# Patient Record
Sex: FEMALE | Race: WHITE | Employment: UNEMPLOYED | URBAN - METROPOLITAN AREA
[De-identification: names, ages, dates, MRNs, and addresses within clinical notes are randomized per-mention and may not be internally consistent; named-entity substitution may affect disease eponyms.]

---

## 2022-07-29 PROBLEM — K21.9 GERD (GASTROESOPHAGEAL REFLUX DISEASE): Status: ACTIVE | Noted: 2022-07-29

## 2022-07-29 PROBLEM — Z3A.29 29 WEEKS GESTATION OF PREGNANCY: Status: ACTIVE | Noted: 2022-07-29

## 2022-07-29 PROBLEM — O21.9 VOMITING DURING PREGNANCY IN THIRD TRIMESTER: Status: ACTIVE | Noted: 2022-07-29

## 2022-09-18 PROBLEM — O34.219 UTERINE SCAR FROM PREVIOUS CESAREAN DELIVERY, ANTEPARTUM CONDITION OR COMPLICATION: Status: ACTIVE | Noted: 2022-09-18

## 2022-09-18 PROBLEM — O36.8330 CATEGORY II FETAL HEART RATE TRACING DURING MATERNAL CARE IN THIRD TRIMESTER: Status: ACTIVE | Noted: 2022-09-18

## 2024-01-26 PROBLEM — Z36.89 NST (NON-STRESS TEST) REACTIVE: Status: ACTIVE | Noted: 2024-01-26

## 2024-01-26 PROBLEM — R10.2 PELVIC PAIN AFFECTING PREGNANCY IN THIRD TRIMESTER, ANTEPARTUM: Status: ACTIVE | Noted: 2024-01-26

## 2024-01-26 PROBLEM — O26.893 PELVIC PAIN AFFECTING PREGNANCY IN THIRD TRIMESTER, ANTEPARTUM: Status: ACTIVE | Noted: 2024-01-26

## 2024-03-05 PROBLEM — O36.8330 CATEGORY II FETAL HEART RATE TRACING DURING MATERNAL CARE IN THIRD TRIMESTER: Status: RESOLVED | Noted: 2022-09-18 | Resolved: 2024-03-05

## 2024-03-05 PROBLEM — Z36.89 NST (NON-STRESS TEST) REACTIVE: Status: RESOLVED | Noted: 2024-01-26 | Resolved: 2024-03-05

## 2024-03-05 PROBLEM — Z3A.29 29 WEEKS GESTATION OF PREGNANCY: Status: RESOLVED | Noted: 2022-07-29 | Resolved: 2024-03-05

## 2024-03-05 PROBLEM — O26.893 HEADACHE IN PREGNANCY, ANTEPARTUM, THIRD TRIMESTER: Status: ACTIVE | Noted: 2024-03-05

## 2024-03-05 PROBLEM — R51.9 HEADACHE IN PREGNANCY, ANTEPARTUM, THIRD TRIMESTER: Status: ACTIVE | Noted: 2024-03-05

## 2024-03-05 PROBLEM — O26.893 PELVIC PAIN AFFECTING PREGNANCY IN THIRD TRIMESTER, ANTEPARTUM: Status: RESOLVED | Noted: 2024-01-26 | Resolved: 2024-03-05

## 2024-03-05 PROBLEM — K21.9 GERD (GASTROESOPHAGEAL REFLUX DISEASE): Status: RESOLVED | Noted: 2022-07-29 | Resolved: 2024-03-05

## 2024-03-05 PROBLEM — R10.2 PELVIC PAIN AFFECTING PREGNANCY IN THIRD TRIMESTER, ANTEPARTUM: Status: RESOLVED | Noted: 2024-01-26 | Resolved: 2024-03-05

## 2024-03-05 PROBLEM — O41.00X0: Status: ACTIVE | Noted: 2024-03-05

## 2024-03-05 PROBLEM — O21.9 VOMITING DURING PREGNANCY IN THIRD TRIMESTER: Status: RESOLVED | Noted: 2022-07-29 | Resolved: 2024-03-05

## 2024-03-05 PROBLEM — O99.013 ANEMIA AFFECTING PREGNANCY IN THIRD TRIMESTER: Status: ACTIVE | Noted: 2024-03-05

## 2024-03-05 PROBLEM — R50.9 FEVER: Status: ACTIVE | Noted: 2024-03-05

## 2024-03-13 PROBLEM — R51.9 HEADACHE IN PREGNANCY, ANTEPARTUM, THIRD TRIMESTER: Status: RESOLVED | Noted: 2024-03-05 | Resolved: 2024-03-13

## 2024-03-13 PROBLEM — O26.893 HEADACHE IN PREGNANCY, ANTEPARTUM, THIRD TRIMESTER: Status: RESOLVED | Noted: 2024-03-05 | Resolved: 2024-03-13

## 2024-03-13 PROBLEM — O47.9 UTERINE CONTRACTIONS: Status: ACTIVE | Noted: 2024-03-13

## 2024-03-13 PROBLEM — R50.9 FEVER: Status: RESOLVED | Noted: 2024-03-05 | Resolved: 2024-03-13

## 2024-03-13 PROBLEM — O36.8130 DECREASED FETAL MOVEMENTS IN THIRD TRIMESTER: Status: ACTIVE | Noted: 2024-03-13

## 2024-03-18 PROBLEM — F41.9 ANXIETY AND DEPRESSION: Status: ACTIVE | Noted: 2024-03-18

## 2024-03-18 PROBLEM — O34.219 PREVIOUS CESAREAN SECTION COMPLICATING PREGNANCY: Status: ACTIVE | Noted: 2022-09-18

## 2024-03-18 PROBLEM — J45.909 MILD ASTHMA WITHOUT COMPLICATION: Status: ACTIVE | Noted: 2024-03-18

## 2024-03-18 PROBLEM — R10.9 ABDOMINAL PAIN IN PREGNANCY, THIRD TRIMESTER: Status: ACTIVE | Noted: 2024-03-05

## 2024-03-18 PROBLEM — Z3A.38 38 WEEKS GESTATION OF PREGNANCY: Status: ACTIVE | Noted: 2024-03-18

## 2024-03-18 PROBLEM — F32.A ANXIETY AND DEPRESSION: Status: ACTIVE | Noted: 2024-03-18

## 2024-03-18 PROBLEM — Z30.2 ENCOUNTER FOR STERILIZATION: Status: ACTIVE | Noted: 2024-03-18

## 2024-03-18 PROBLEM — O36.60X0 LGA (LARGE FOR GESTATIONAL AGE) FETUS AFFECTING MANAGEMENT OF MOTHER: Status: ACTIVE | Noted: 2024-03-18

## 2024-09-24 ENCOUNTER — OFFICE VISIT (OUTPATIENT)
Facility: CLINIC | Age: 29
End: 2024-09-24

## 2024-09-24 VITALS
TEMPERATURE: 98 F | OXYGEN SATURATION: 97 % | HEIGHT: 67 IN | HEART RATE: 75 BPM | RESPIRATION RATE: 16 BRPM | DIASTOLIC BLOOD PRESSURE: 67 MMHG | BODY MASS INDEX: 28.98 KG/M2 | SYSTOLIC BLOOD PRESSURE: 104 MMHG

## 2024-09-24 DIAGNOSIS — Z76.89 ENCOUNTER TO ESTABLISH CARE: Primary | ICD-10-CM

## 2024-09-24 DIAGNOSIS — Z86.39 HISTORY OF IRON DEFICIENCY: ICD-10-CM

## 2024-09-24 DIAGNOSIS — Z13.89 SCREENING FOR HEMATURIA OR PROTEINURIA: ICD-10-CM

## 2024-09-24 DIAGNOSIS — F41.9 ANXIETY AND DEPRESSION: ICD-10-CM

## 2024-09-24 DIAGNOSIS — E55.9 VITAMIN D DEFICIENCY: ICD-10-CM

## 2024-09-24 DIAGNOSIS — J45.20 MILD INTERMITTENT ASTHMA WITHOUT COMPLICATION: ICD-10-CM

## 2024-09-24 DIAGNOSIS — R55 SYNCOPE, UNSPECIFIED SYNCOPE TYPE: ICD-10-CM

## 2024-09-24 DIAGNOSIS — Z13.220 SCREENING CHOLESTEROL LEVEL: ICD-10-CM

## 2024-09-24 DIAGNOSIS — F32.A ANXIETY AND DEPRESSION: ICD-10-CM

## 2024-09-24 DIAGNOSIS — S99.922A INJURY OF LEFT FOOT, INITIAL ENCOUNTER: ICD-10-CM

## 2024-09-24 DIAGNOSIS — F31.9 BIPOLAR DEPRESSION (HCC): ICD-10-CM

## 2024-09-24 DIAGNOSIS — Z13.1 SCREENING FOR DIABETES MELLITUS: ICD-10-CM

## 2024-09-24 PROBLEM — O41.00X0: Status: RESOLVED | Noted: 2024-03-05 | Resolved: 2024-09-24

## 2024-09-24 PROBLEM — O99.013 ANEMIA AFFECTING PREGNANCY IN THIRD TRIMESTER: Status: RESOLVED | Noted: 2024-03-05 | Resolved: 2024-09-24

## 2024-09-24 PROBLEM — Z30.2 ENCOUNTER FOR STERILIZATION: Status: RESOLVED | Noted: 2024-03-18 | Resolved: 2024-09-24

## 2024-09-24 PROBLEM — R10.9 ABDOMINAL PAIN IN PREGNANCY, THIRD TRIMESTER: Status: RESOLVED | Noted: 2024-03-05 | Resolved: 2024-09-24

## 2024-09-24 PROBLEM — O26.893 ABDOMINAL PAIN IN PREGNANCY, THIRD TRIMESTER: Status: RESOLVED | Noted: 2024-03-05 | Resolved: 2024-09-24

## 2024-09-24 PROBLEM — O36.60X0 LGA (LARGE FOR GESTATIONAL AGE) FETUS AFFECTING MANAGEMENT OF MOTHER: Status: RESOLVED | Noted: 2024-03-18 | Resolved: 2024-09-24

## 2024-09-24 RX ORDER — ALBUTEROL SULFATE 90 UG/1
2 INHALANT RESPIRATORY (INHALATION) 4 TIMES DAILY PRN
Qty: 36 G | Refills: 3 | Status: SHIPPED | OUTPATIENT
Start: 2024-09-24

## 2024-09-24 SDOH — HEALTH STABILITY: PHYSICAL HEALTH: ON AVERAGE, HOW MANY DAYS PER WEEK DO YOU ENGAGE IN MODERATE TO STRENUOUS EXERCISE (LIKE A BRISK WALK)?: 7 DAYS

## 2024-09-24 SDOH — ECONOMIC STABILITY: FOOD INSECURITY: WITHIN THE PAST 12 MONTHS, YOU WORRIED THAT YOUR FOOD WOULD RUN OUT BEFORE YOU GOT MONEY TO BUY MORE.: NEVER TRUE

## 2024-09-24 SDOH — ECONOMIC STABILITY: FOOD INSECURITY: WITHIN THE PAST 12 MONTHS, THE FOOD YOU BOUGHT JUST DIDN'T LAST AND YOU DIDN'T HAVE MONEY TO GET MORE.: NEVER TRUE

## 2024-09-24 SDOH — ECONOMIC STABILITY: INCOME INSECURITY: HOW HARD IS IT FOR YOU TO PAY FOR THE VERY BASICS LIKE FOOD, HOUSING, MEDICAL CARE, AND HEATING?: VERY HARD

## 2024-09-24 SDOH — HEALTH STABILITY: PHYSICAL HEALTH: ON AVERAGE, HOW MANY MINUTES DO YOU ENGAGE IN EXERCISE AT THIS LEVEL?: 150+ MIN

## 2024-09-24 ASSESSMENT — PATIENT HEALTH QUESTIONNAIRE - PHQ9
7. TROUBLE CONCENTRATING ON THINGS, SUCH AS READING THE NEWSPAPER OR WATCHING TELEVISION: NOT AT ALL
SUM OF ALL RESPONSES TO PHQ QUESTIONS 1-9: 11
4. FEELING TIRED OR HAVING LITTLE ENERGY: NEARLY EVERY DAY
10. IF YOU CHECKED OFF ANY PROBLEMS, HOW DIFFICULT HAVE THESE PROBLEMS MADE IT FOR YOU TO DO YOUR WORK, TAKE CARE OF THINGS AT HOME, OR GET ALONG WITH OTHER PEOPLE: SOMEWHAT DIFFICULT
SUM OF ALL RESPONSES TO PHQ QUESTIONS 1-9: 11
1. LITTLE INTEREST OR PLEASURE IN DOING THINGS: NOT AT ALL
3. TROUBLE FALLING OR STAYING ASLEEP: MORE THAN HALF THE DAYS
SUM OF ALL RESPONSES TO PHQ QUESTIONS 1-9: 11
6. FEELING BAD ABOUT YOURSELF - OR THAT YOU ARE A FAILURE OR HAVE LET YOURSELF OR YOUR FAMILY DOWN: SEVERAL DAYS
SUM OF ALL RESPONSES TO PHQ9 QUESTIONS 1 & 2: 2
2. FEELING DOWN, DEPRESSED OR HOPELESS: MORE THAN HALF THE DAYS
8. MOVING OR SPEAKING SO SLOWLY THAT OTHER PEOPLE COULD HAVE NOTICED. OR THE OPPOSITE, BEING SO FIGETY OR RESTLESS THAT YOU HAVE BEEN MOVING AROUND A LOT MORE THAN USUAL: NOT AT ALL
9. THOUGHTS THAT YOU WOULD BE BETTER OFF DEAD, OR OF HURTING YOURSELF: NOT AT ALL
SUM OF ALL RESPONSES TO PHQ QUESTIONS 1-9: 11
5. POOR APPETITE OR OVEREATING: NEARLY EVERY DAY

## 2024-09-24 ASSESSMENT — ENCOUNTER SYMPTOMS
NAUSEA: 0
BLOOD IN STOOL: 0
CONSTIPATION: 0
COUGH: 0
SHORTNESS OF BREATH: 0
VOMITING: 0
RHINORRHEA: 0
DIARRHEA: 0

## 2024-09-26 LAB
BACTERIA: NEGATIVE
BASOPHILS ABSOLUTE: 0.1 K/UL (ref 0–0.2)
BASOPHILS RELATIVE PERCENT: 1 % (ref 0–2)
BILIRUB SERPL-MCNC: NEGATIVE MG/DL
BLOOD: NEGATIVE
CHOLESTEROL, TOTAL: 165 MG/DL (ref 110–200)
CHOLESTEROL/HDL RATIO: 3.6 (ref 0–5)
CLARITY, UA: CLEAR
COLOR, UA: YELLOW
EOSINOPHIL # BLD: 4 % (ref 0–6)
EOSINOPHILS ABSOLUTE: 0.4 K/UL (ref 0–0.5)
EPITHELIAL CELLS: NORMAL /HPF
ESTIMATED AVERAGE GLUCOSE: 102 MG/DL (ref 91–123)
FERRITIN: 14 NG/ML (ref 10–291)
GLUCOSE: NEGATIVE MG/DL
HBA1C MFR BLD: 5.2 % (ref 4.8–5.6)
HCT VFR BLD CALC: 44.7 % (ref 35.1–46.5)
HDLC SERPL-MCNC: 46 MG/DL
HEMOGLOBIN: 13.8 G/DL (ref 11.7–15.5)
HYALINE CASTS: NORMAL /LPF (ref 0–2)
IRON % SATURATION: 10 % (ref 20–50)
IRON: 43 MCG/DL (ref 30–160)
KETONES, URINE: NEGATIVE MG/DL
LDL CHOLESTEROL: 86 MG/DL (ref 50–99)
LDL/HDL RATIO: 1.9
LEUKOCYTE ESTERASE, URINE: NEGATIVE
LYMPHOCYTES # BLD: 21 % (ref 20–45)
LYMPHOCYTES ABSOLUTE: 2.3 K/UL (ref 1–4.8)
MCH RBC QN AUTO: 27 PG (ref 26–34)
MCHC RBC AUTO-ENTMCNC: 31 G/DL (ref 31–36)
MCV RBC AUTO: 86 FL (ref 80–99)
MONOCYTES ABSOLUTE: 0.9 K/UL (ref 0.1–1)
MONOCYTES: 9 % (ref 3–12)
NEUTROPHILS ABSOLUTE: 7 K/UL (ref 1.8–7.7)
NEUTROPHILS: 65 % (ref 40–75)
NITRITE, URINE: NEGATIVE
NON-HDL CHOLESTEROL: 119 MG/DL
PDW BLD-RTO: 16.4 % (ref 10–15.5)
PH, URINE: 6 PH (ref 5–8)
PLATELET # BLD: 368 K/UL (ref 140–440)
PMV BLD AUTO: 10.5 FL (ref 9–13)
PROTEIN UA: NEGATIVE MG/DL
RBC # BLD: 5.19 M/UL (ref 3.8–5.2)
RBC URINE: NORMAL /HPF
SPECIFIC GRAVITY UA: 1.01 (ref 1–1.03)
TOTAL IRON BINDING CAPACITY: 439 MCG/DL (ref 228–428)
TRIGL SERPL-MCNC: 164 MG/DL (ref 40–149)
TSH SERPL DL<=0.05 MIU/L-ACNC: 4.4 MCU/ML (ref 0.27–4.2)
UIBC: 396 MCG/DL (ref 110–370)
UROBILINOGEN, URINE: 0.2 MG/DL
VITAMIN D 25-HYDROXY: 32.9 NG/ML (ref 32–100)
VLDLC SERPL CALC-MCNC: 33 MG/DL (ref 8–30)
WBC # BLD: 10.8 K/UL (ref 4–11)
WBC UA: NORMAL /HPF (ref 0–5)

## 2024-10-21 ENCOUNTER — TELEPHONE (OUTPATIENT)
Facility: CLINIC | Age: 29
End: 2024-10-21

## 2024-10-21 DIAGNOSIS — R55 SYNCOPE, UNSPECIFIED SYNCOPE TYPE: Primary | ICD-10-CM

## 2024-10-21 NOTE — TELEPHONE ENCOUNTER
----- Message from Clara POMPA sent at 10/21/2024 10:13 AM EDT -----  Regarding: referral to Dr Nixon  Hi Dr Do ,    Could you place an internal referral to Dr Nixon for this patient? Her insurance is not accepted anywhere and [facility] said they are still not taking new referrals .     Thank you, Clara

## 2024-10-28 ENCOUNTER — HOSPITAL ENCOUNTER (OUTPATIENT)
Facility: HOSPITAL | Age: 29
Setting detail: SPECIMEN
Discharge: HOME | End: 2024-10-31
Payer: COMMERCIAL

## 2024-10-28 ENCOUNTER — OFFICE VISIT (OUTPATIENT)
Facility: CLINIC | Age: 29
End: 2024-10-28
Payer: COMMERCIAL

## 2024-10-28 VITALS
OXYGEN SATURATION: 98 % | SYSTOLIC BLOOD PRESSURE: 107 MMHG | TEMPERATURE: 98 F | DIASTOLIC BLOOD PRESSURE: 83 MMHG | HEART RATE: 68 BPM | BODY MASS INDEX: 25.18 KG/M2 | HEIGHT: 67 IN | WEIGHT: 160.4 LBS | RESPIRATION RATE: 16 BRPM

## 2024-10-28 DIAGNOSIS — E61.1 IRON DEFICIENCY: ICD-10-CM

## 2024-10-28 DIAGNOSIS — Z00.00 ANNUAL PHYSICAL EXAM: Primary | ICD-10-CM

## 2024-10-28 DIAGNOSIS — R79.89 ELEVATED TSH: ICD-10-CM

## 2024-10-28 DIAGNOSIS — Z12.83 SKIN CANCER SCREENING: ICD-10-CM

## 2024-10-28 DIAGNOSIS — F32.A ANXIETY AND DEPRESSION: ICD-10-CM

## 2024-10-28 DIAGNOSIS — F41.9 ANXIETY AND DEPRESSION: ICD-10-CM

## 2024-10-28 DIAGNOSIS — M85.88 OSTEOPENIA OF OTHER SITE: ICD-10-CM

## 2024-10-28 DIAGNOSIS — R74.8 LOW SERUM HDL: ICD-10-CM

## 2024-10-28 DIAGNOSIS — F31.9 BIPOLAR DEPRESSION (HCC): ICD-10-CM

## 2024-10-28 LAB
T3FREE SERPL-MCNC: 2.8 PG/ML (ref 2.18–3.98)
T4 FREE SERPL-MCNC: 0.9 NG/DL (ref 0.7–1.5)
TSH SERPL DL<=0.05 MIU/L-ACNC: 2.35 UIU/ML (ref 0.36–3.74)

## 2024-10-28 PROCEDURE — 99214 OFFICE O/P EST MOD 30 MIN: CPT | Performed by: FAMILY MEDICINE

## 2024-10-28 PROCEDURE — 84439 ASSAY OF FREE THYROXINE: CPT

## 2024-10-28 PROCEDURE — 36415 COLL VENOUS BLD VENIPUNCTURE: CPT

## 2024-10-28 PROCEDURE — 84443 ASSAY THYROID STIM HORMONE: CPT

## 2024-10-28 PROCEDURE — 84481 FREE ASSAY (FT-3): CPT

## 2024-10-28 PROCEDURE — 99395 PREV VISIT EST AGE 18-39: CPT | Performed by: FAMILY MEDICINE

## 2024-10-28 RX ORDER — CALCIUM CARBONATE/VITAMIN D3 500MG-5MCG
TABLET ORAL
Qty: 180 TABLET | Refills: 2 | Status: SHIPPED | OUTPATIENT
Start: 2024-10-28

## 2024-10-28 ASSESSMENT — ENCOUNTER SYMPTOMS
VOMITING: 0
SHORTNESS OF BREATH: 0
DIARRHEA: 0
NAUSEA: 0
RHINORRHEA: 0
COUGH: 0

## 2024-10-28 ASSESSMENT — PATIENT HEALTH QUESTIONNAIRE - PHQ9
5. POOR APPETITE OR OVEREATING: MORE THAN HALF THE DAYS
3. TROUBLE FALLING OR STAYING ASLEEP: MORE THAN HALF THE DAYS
SUM OF ALL RESPONSES TO PHQ QUESTIONS 1-9: 10
4. FEELING TIRED OR HAVING LITTLE ENERGY: NEARLY EVERY DAY
SUM OF ALL RESPONSES TO PHQ QUESTIONS 1-9: 10
10. IF YOU CHECKED OFF ANY PROBLEMS, HOW DIFFICULT HAVE THESE PROBLEMS MADE IT FOR YOU TO DO YOUR WORK, TAKE CARE OF THINGS AT HOME, OR GET ALONG WITH OTHER PEOPLE: SOMEWHAT DIFFICULT
SUM OF ALL RESPONSES TO PHQ QUESTIONS 1-9: 10
SUM OF ALL RESPONSES TO PHQ9 QUESTIONS 1 & 2: 3
1. LITTLE INTEREST OR PLEASURE IN DOING THINGS: SEVERAL DAYS
6. FEELING BAD ABOUT YOURSELF - OR THAT YOU ARE A FAILURE OR HAVE LET YOURSELF OR YOUR FAMILY DOWN: NOT AT ALL
9. THOUGHTS THAT YOU WOULD BE BETTER OFF DEAD, OR OF HURTING YOURSELF: NOT AT ALL
8. MOVING OR SPEAKING SO SLOWLY THAT OTHER PEOPLE COULD HAVE NOTICED. OR THE OPPOSITE, BEING SO FIGETY OR RESTLESS THAT YOU HAVE BEEN MOVING AROUND A LOT MORE THAN USUAL: NOT AT ALL
7. TROUBLE CONCENTRATING ON THINGS, SUCH AS READING THE NEWSPAPER OR WATCHING TELEVISION: NOT AT ALL
SUM OF ALL RESPONSES TO PHQ QUESTIONS 1-9: 10
2. FEELING DOWN, DEPRESSED OR HOPELESS: MORE THAN HALF THE DAYS

## 2024-10-28 NOTE — PATIENT INSTRUCTIONS
Call [phone] for Central Scheduling    Call [phone] to schedule your imaging study with [facility]          [facility]  Main Phone:  [phone]    Scheduling Phone:  [phone]    Fax (Scheduling):  [phone]    ?  [facility]  [address] [place] [id number]    MRI, X-ray, Ultrasound    HOURS: Mon. - Sat. 6:00am to 10:30pm,  Sun. 8:00am to 4:30pm    ?  [facility]  [address], [address]  [place] [address]    MRI, CT, C-arm Fluoroscopy, Nuclear Medicine, X-ray, Ultrasound    HOURS: Mon. - Sat. 6:00am to 10:30pm,  Sun. 8:00am to 4:30pm    ?  [facility]  [address] [place] [address]    MRI, CT, X-ray, Ultrasound    HOURS: Mon. - Sat. 6:00am to 10:30pm,  Sun. 8:00am to 4:30pm          [facility]  Phone:  [phone]    Fax:  [phone]    ?  [address] [address]    Open MRI    HOURS: M-F 7:00 am - 10:00 pm    Weekends available    ?  [facility]  [address], [place] [address]    MRI, CT and XR    HOURS: M- F 7:00 am - 5:00 pm

## 2024-10-28 NOTE — PROGRESS NOTES
[address] [place] [address]               [phone]        Tanika Pina is a 29 y.o. female and presents with Annual Exam and Discuss Labs           Assessment/Plan:  Tanika was seen today for annual exam and discuss labs.    Diagnoses and all orders for this visit:    Annual physical exam    Low serum HDL    Iron deficiency    Elevated TSH  -     T3, Free; Future  -     TSH + Free T4 Panel; Future    Osteopenia of other site  -     Calcium Carb-Cholecalciferol (CALCIUM + VITAMIN D3) 500-5 MG-MCG TABS; Take one tablet po bid  -     DEXA BONE DENSITY AXIAL SKELETON; Future    Anxiety and depression  -     External Referral To Psychology    Bipolar depression (HCC)  -     External Referral To Psychology    Skin cancer screening  -     Amb External Referral To Dermatology      Advised to eat healthy diet and begin regular exercise with cardio 2.5 hours/week mixed with strength training;    Encouraged to keep eye/dental exam up to date    Iron deficiency: encouraged to begin otc iron with periods    Recheck thyroid labs today    Update dexa given fam hx     Refer to [facility]    Refer to Derm for screening     Given number for Neurology      Follow up and disposition:   Return in about 4 weeks (around 11/25/2024), or if symptoms worsen or fail to improve, for follow up -15 min.      Health Maintenance:   Health Maintenance   Topic Date Due    Varicella vaccine (1 of 2 - 13+ 2-dose series) Never done    Hepatitis C screen  Never done    Hepatitis B vaccine (1 of 3 - 19+ 3-dose series) Never done    DTaP/Tdap/Td vaccine (1 - Tdap) Never done    Pap smear  Never done    COVID-19 Vaccine (2 - 2023-24 season) 09/01/2024    Flu vaccine (1) 09/24/2025 (Originally 8/1/2024)    Pneumococcal 0-64 years Vaccine (1 of 2 - PCV) 09/24/2025 (Originally 6/25/2003)    Depression Monitoring  09/24/2025    HIV screen  Completed    Hepatitis A vaccine  Aged Out    Hib vaccine  Aged Out    HPV

## 2024-10-28 NOTE — PROGRESS NOTES
Tanika JACQUELINE Pina presents today for   Chief Complaint   Patient presents with    Annual Exam    Discuss Labs       Is someone accompanying this pt? no    Is the patient using any DME equipment during OV? no    Depression Screening:       No data to display                Learning Assessment:  Failed to redirect to the Timeline version of the REVFS SmartLink.    Health Maintenance reviewed and discussed and ordered per Provider.    Health Maintenance Due   Topic Date Due    Varicella vaccine (1 of 2 - 13+ 2-dose series) Never done    Hepatitis C screen  Never done    Hepatitis B vaccine (1 of 3 - 19+ 3-dose series) Never done    DTaP/Tdap/Td vaccine (1 - Tdap) Never done    Pap smear  Never done    COVID-19 Vaccine (2 - 2023-24 season) 09/01/2024   .    \"Have you been to the ER, urgent care clinic since your last visit?  Hospitalized since your last visit?\"    no    “Have you seen or consulted any other health care providers outside our system since your last visit?”    no     “Have you had a pap smear?”    Yes [facility]     No cervical cancer screening on file

## 2024-11-22 ENCOUNTER — OFFICE VISIT (OUTPATIENT)
Age: 29
End: 2024-11-22

## 2024-11-22 VITALS
HEIGHT: 67 IN | HEART RATE: 103 BPM | DIASTOLIC BLOOD PRESSURE: 76 MMHG | SYSTOLIC BLOOD PRESSURE: 117 MMHG | OXYGEN SATURATION: 94 % | RESPIRATION RATE: 16 BRPM | WEIGHT: 158.8 LBS | BODY MASS INDEX: 24.92 KG/M2 | TEMPERATURE: 99 F

## 2024-11-22 DIAGNOSIS — R55 VASOVAGAL SYNCOPE: Primary | ICD-10-CM

## 2024-11-22 DIAGNOSIS — G43.709 CHRONIC MIGRAINE W/O AURA W/O STATUS MIGRAINOSUS, NOT INTRACTABLE: ICD-10-CM

## 2024-11-22 DIAGNOSIS — M62.838 MUSCLE SPASMS OF NECK: ICD-10-CM

## 2024-11-22 DIAGNOSIS — G90.A POTS (POSTURAL ORTHOSTATIC TACHYCARDIA SYNDROME): ICD-10-CM

## 2024-11-22 DIAGNOSIS — M54.81 BILATERAL OCCIPITAL NEURALGIA: ICD-10-CM

## 2024-11-22 RX ORDER — AMITRIPTYLINE HYDROCHLORIDE 10 MG/1
10 TABLET ORAL NIGHTLY
Qty: 90 TABLET | Refills: 1 | Status: SHIPPED | OUTPATIENT
Start: 2024-11-22 | End: 2024-11-22 | Stop reason: ALTCHOICE

## 2024-11-22 RX ORDER — AMITRIPTYLINE HYDROCHLORIDE 10 MG/1
10 TABLET ORAL NIGHTLY
Qty: 90 TABLET | Refills: 1 | Status: SHIPPED | OUTPATIENT
Start: 2024-11-22

## 2024-11-22 RX ORDER — RIZATRIPTAN BENZOATE 10 MG/1
10 TABLET ORAL DAILY PRN
Qty: 12 TABLET | Refills: 5 | Status: SHIPPED | OUTPATIENT
Start: 2024-11-22

## 2024-11-22 NOTE — PROGRESS NOTES
SUBJECTIVE:   Tanika Pina is a 29 y.o. female seen regarding the following:     Chief Complaint   Patient presents with    Loss of Consciousness       HPI:  The patient is referred for evaluation of syncope.  The patient has episodes where she feels very hot and gets a metallic taste in the mouth and then pass out.  No shaking or convulsive activity.  She has seen a neurologist previously-eeg was done.  Tilt table test was ordered but she became pregnant. She was seen by cardiology-wore 14 dy heart monitor and everything was reassuring per patient.  When she pass out, it last for 1-2 minutes.  She knows what happened when she wakes up. She is aware when it is going to happen but does not have enough time to sit down. She is not confused when she wakes up. No tongue biting. No loss of bowel/bladder control.  Smoking marijuana can trigger the episodes so she does not smoke.  She gets a headache afterwards. Sx usually occur while standing. Never when lying down. She gets headaches outside of these events. She has \"regular\" headaches. Mor recently, she has beeg getting bilateral shooting pain from the occiput, she can't move her head when the sx occur then she gets pain in the entire head-described as throbbing pain.  No associated light/noise sensitivity usually. Occasionally high pitched noises can bother her with headaches. She gets blurred vision.  When she has the blurred vision, she has light sensitivity. That headache can last for hours even the entire day. She has difficulty sleeping due to having two toddlers and an infant. No snoring.  She had concussions in high school.      Past Medical History, Past Surgical History, Family history, Social History, and Medications were all reviewed with the patient today and updated as necessary.     Current Outpatient Medications   Medication Sig Dispense Refill    Calcium Carb-Cholecalciferol (CALCIUM + VITAMIN D3) 500-5 MG-MCG TABS Take one tablet po bid 180

## 2024-11-22 NOTE — PATIENT INSTRUCTIONS
Regular physical activity is important for migraine prevention. At least 30 minutes of physical activity such as yoga, walking four times per week.  2.    Guided relaxation and meditation techniques are important for migraine prevention and for overall wellbeing. Please visit www.[employer] or youtube for videos to learn how to do these techniques. Start out with at least five minutes of mindfulness activity daily.  3.  Maintain a regular sleep schedule.  4. Home stretching exercises are great if you have tightness or tension in your neck and shoulders.  5. Follow up as scheduled here in the neurology clinic.      Supportive care.  Behavioral measures such as staying hydrated high sodium diet changing positions slowly and compression stockings were discussed     MRI brain  EEG  Bloodwork  Amitriptyline 10 mg at bedtime for headache prevention  Rizatriptan-one tablet as soon as headache comes on  Call us if the occipital nerve acts up again so we can do an occipital nerve block

## 2024-12-01 PROBLEM — Z87.898 HISTORY OF SYNCOPE: Status: ACTIVE | Noted: 2024-12-01

## 2024-12-01 PROBLEM — G43.709 CHRONIC MIGRAINE WITHOUT AURA WITHOUT STATUS MIGRAINOSUS, NOT INTRACTABLE: Status: ACTIVE | Noted: 2024-12-01

## 2024-12-02 ENCOUNTER — TELEMEDICINE (OUTPATIENT)
Facility: CLINIC | Age: 29
End: 2024-12-02
Payer: COMMERCIAL

## 2024-12-02 ENCOUNTER — TELEPHONE (OUTPATIENT)
Facility: CLINIC | Age: 29
End: 2024-12-02

## 2024-12-02 DIAGNOSIS — F31.9 BIPOLAR DEPRESSION (HCC): Primary | ICD-10-CM

## 2024-12-02 DIAGNOSIS — R61 SWEATING INCREASE: ICD-10-CM

## 2024-12-02 DIAGNOSIS — G43.709 CHRONIC MIGRAINE WITHOUT AURA WITHOUT STATUS MIGRAINOSUS, NOT INTRACTABLE: ICD-10-CM

## 2024-12-02 DIAGNOSIS — Z87.898 HISTORY OF SYNCOPE: ICD-10-CM

## 2024-12-02 DIAGNOSIS — K59.03 DRUG-INDUCED CONSTIPATION: ICD-10-CM

## 2024-12-02 PROCEDURE — 99214 OFFICE O/P EST MOD 30 MIN: CPT | Performed by: FAMILY MEDICINE

## 2024-12-02 ASSESSMENT — PATIENT HEALTH QUESTIONNAIRE - PHQ9
4. FEELING TIRED OR HAVING LITTLE ENERGY: NOT AT ALL
SUM OF ALL RESPONSES TO PHQ QUESTIONS 1-9: 6
1. LITTLE INTEREST OR PLEASURE IN DOING THINGS: MORE THAN HALF THE DAYS
7. TROUBLE CONCENTRATING ON THINGS, SUCH AS READING THE NEWSPAPER OR WATCHING TELEVISION: NOT AT ALL
SUM OF ALL RESPONSES TO PHQ9 QUESTIONS 1 & 2: 3
9. THOUGHTS THAT YOU WOULD BE BETTER OFF DEAD, OR OF HURTING YOURSELF: NOT AT ALL
8. MOVING OR SPEAKING SO SLOWLY THAT OTHER PEOPLE COULD HAVE NOTICED. OR THE OPPOSITE, BEING SO FIGETY OR RESTLESS THAT YOU HAVE BEEN MOVING AROUND A LOT MORE THAN USUAL: NOT AT ALL
2. FEELING DOWN, DEPRESSED OR HOPELESS: SEVERAL DAYS
SUM OF ALL RESPONSES TO PHQ QUESTIONS 1-9: 6
SUM OF ALL RESPONSES TO PHQ QUESTIONS 1-9: 6
5. POOR APPETITE OR OVEREATING: NEARLY EVERY DAY
10. IF YOU CHECKED OFF ANY PROBLEMS, HOW DIFFICULT HAVE THESE PROBLEMS MADE IT FOR YOU TO DO YOUR WORK, TAKE CARE OF THINGS AT HOME, OR GET ALONG WITH OTHER PEOPLE: NOT DIFFICULT AT ALL
SUM OF ALL RESPONSES TO PHQ QUESTIONS 1-9: 6
6. FEELING BAD ABOUT YOURSELF - OR THAT YOU ARE A FAILURE OR HAVE LET YOURSELF OR YOUR FAMILY DOWN: NOT AT ALL
3. TROUBLE FALLING OR STAYING ASLEEP: NOT AT ALL

## 2024-12-02 ASSESSMENT — ENCOUNTER SYMPTOMS
SHORTNESS OF BREATH: 0
DIARRHEA: 0
NAUSEA: 0
CONSTIPATION: 1
VOMITING: 0
COUGH: 0
RHINORRHEA: 0

## 2024-12-02 NOTE — PROGRESS NOTES
Tanika Pina is a 29 y.o. female (: 1995) presenting to address:    Chief Complaint   Patient presents with    Follow-up       There were no vitals filed for this visit.    Coordination of Care Questionaire:     \"Have you been to the ER, urgent care clinic since your last visit?  Hospitalized since your last visit?\"    No     “Have you seen or consulted any other health care providers outside our system since your last visit?”    No      “Have you had a pap smear?”    No

## 2024-12-02 NOTE — PROGRESS NOTES
[address] [place] [address]               [phone]        Tanika Pina is a 29 y.o. female and presents with Follow-up           Assessment/Plan:  Tanika was seen today for follow-up.    Diagnoses and all orders for this visit:    Bipolar depression (HCC)    History of syncope    Chronic migraine without aura without status migrainosus, not intractable    Drug-induced constipation    Sweating increase    Depression: doing better; continue current meds through psych; sweating may be related to this/additional meds; normal thyroid; continue to monitor  No syncope per pt; continue migraine med treatment; advised to call to schedule MRI/EEG ordered through Neurology; advised to contact Neuro to adjust abortive migraine treatment if ineffective with 2nd dose; encouraged to keep meds on her person at all times for future use  Relates recent episode of constipation to iron supplement; advised to take docusate with each iron supplement          Follow up and disposition:   Return in about 3 months (around 3/4/2025), or if symptoms worsen or fail to improve, for follow up -15 min 3:30 virtual 15 min visit.      Health Maintenance:   Health Maintenance   Topic Date Due    Varicella vaccine (1 of 2 - 13+ 2-dose series) Never done    Hepatitis C screen  Never done    Hepatitis B vaccine (1 of 3 - 19+ 3-dose series) Never done    DTaP/Tdap/Td vaccine (1 - Tdap) Never done    Pap smear  Never done    COVID-19 Vaccine (2 - 2023-24 season) 09/01/2024    Flu vaccine (1) 09/24/2025 (Originally 8/1/2024)    Pneumococcal 0-64 years Vaccine (1 of 2 - PCV) 09/24/2025 (Originally 6/25/2003)    Depression Monitoring  10/28/2025    HIV screen  Completed    Hepatitis A vaccine  Aged Out    Hib vaccine  Aged Out    HPV vaccine  Aged Out    Polio vaccine  Aged Out    Meningococcal (ACWY) vaccine  Aged Out    Depression Screen  Discontinued        Subjective   30y/o female here for f/u on

## 2024-12-13 ENCOUNTER — HOSPITAL ENCOUNTER (OUTPATIENT)
Facility: HOSPITAL | Age: 29
Discharge: HOME | End: 2024-12-16
Payer: COMMERCIAL

## 2024-12-13 DIAGNOSIS — G43.709 CHRONIC MIGRAINE W/O AURA W/O STATUS MIGRAINOSUS, NOT INTRACTABLE: ICD-10-CM

## 2024-12-13 DIAGNOSIS — M62.838 MUSCLE SPASMS OF NECK: ICD-10-CM

## 2024-12-13 DIAGNOSIS — M54.81 BILATERAL OCCIPITAL NEURALGIA: ICD-10-CM

## 2024-12-13 DIAGNOSIS — R55 VASOVAGAL SYNCOPE: ICD-10-CM

## 2024-12-13 PROCEDURE — 95816 EEG AWAKE AND DROWSY: CPT

## 2024-12-13 PROCEDURE — 95816 EEG AWAKE AND DROWSY: CPT | Performed by: PSYCHIATRY & NEUROLOGY

## 2024-12-15 NOTE — PROCEDURES
Routine EEG Report    Ordering Physician: Panda Allison MD      Reason for EEG: syncope    Technical Summary: This EEG was performed with a 32-channel digital EEG machine with electrodes placed according to the international 10-20 system of placement.            Background:   In the maximally alert state, there is a normal posterior dominant rhythm approaching 9-10 hz bilaterally and symmetrically. Normal variability and reactivity noted.     Hyperventilation: Hyperventilation was performed and no abnormalaties noted.  Photic Stimulation: Photic stimulation was performed at various flash frequencies and no change in background noted    Sleep: Not captured    Impression: This EEG is normal. No focal epileptiform discharges, focal slowing noted. Clinical correlation advised.

## 2025-01-13 ENCOUNTER — HOSPITAL ENCOUNTER (OUTPATIENT)
Facility: HOSPITAL | Age: 30
Discharge: HOME | End: 2025-01-16
Attending: FAMILY MEDICINE
Payer: COMMERCIAL

## 2025-01-13 DIAGNOSIS — M85.88 OSTEOPENIA OF OTHER SITE: ICD-10-CM

## 2025-01-13 PROCEDURE — 77080 DXA BONE DENSITY AXIAL: CPT

## 2025-01-29 ENCOUNTER — HOSPITAL ENCOUNTER (OUTPATIENT)
Facility: HOSPITAL | Age: 30
Discharge: HOME | End: 2025-02-01
Payer: COMMERCIAL

## 2025-01-29 ENCOUNTER — HOSPITAL ENCOUNTER (OUTPATIENT)
Facility: HOSPITAL | Age: 30
Discharge: HOME | End: 2025-02-01
Attending: PSYCHIATRY & NEUROLOGY
Payer: COMMERCIAL

## 2025-01-29 DIAGNOSIS — M54.81 BILATERAL OCCIPITAL NEURALGIA: ICD-10-CM

## 2025-01-29 DIAGNOSIS — R55 VASOVAGAL SYNCOPE: ICD-10-CM

## 2025-01-29 DIAGNOSIS — G90.A POTS (POSTURAL ORTHOSTATIC TACHYCARDIA SYNDROME): ICD-10-CM

## 2025-01-29 DIAGNOSIS — M62.838 MUSCLE SPASMS OF NECK: ICD-10-CM

## 2025-01-29 DIAGNOSIS — G43.709 CHRONIC MIGRAINE W/O AURA W/O STATUS MIGRAINOSUS, NOT INTRACTABLE: ICD-10-CM

## 2025-01-29 LAB
ERYTHROCYTE [SEDIMENTATION RATE] IN BLOOD: 10 MM/HR (ref 0–30)
RHEUMATOID FACT SERPL-ACNC: <10 IU/ML

## 2025-01-29 PROCEDURE — 86200 CCP ANTIBODY: CPT

## 2025-01-29 PROCEDURE — 86235 NUCLEAR ANTIGEN ANTIBODY: CPT

## 2025-01-29 PROCEDURE — 36415 COLL VENOUS BLD VENIPUNCTURE: CPT

## 2025-01-29 PROCEDURE — 85652 RBC SED RATE AUTOMATED: CPT

## 2025-01-29 PROCEDURE — 86037 ANCA TITER EACH ANTIBODY: CPT

## 2025-01-29 PROCEDURE — 70551 MRI BRAIN STEM W/O DYE: CPT

## 2025-01-29 PROCEDURE — 83516 IMMUNOASSAY NONANTIBODY: CPT

## 2025-01-29 PROCEDURE — 86431 RHEUMATOID FACTOR QUANT: CPT

## 2025-01-29 PROCEDURE — 86038 ANTINUCLEAR ANTIBODIES: CPT

## 2025-01-30 LAB
CCP IGA+IGG SERPL IA-ACNC: 4 UNITS (ref 0–19)
ENA SS-A AB SER-ACNC: <0.2 AI (ref 0–0.9)
ENA SS-B AB SER-ACNC: <0.2 AI (ref 0–0.9)

## 2025-01-31 LAB
ANA TITR SER IF: NEGATIVE
C-ANCA TITR SER IF: NORMAL TITER
LABORATORY COMMENT REPORT: NORMAL
MYELOPEROXIDASE AB SER IA-ACNC: <0.2 UNITS (ref 0–0.9)
P-ANCA ATYPICAL TITR SER IF: NORMAL TITER
P-ANCA TITR SER IF: NORMAL TITER
PROTEINASE3 AB SER IA-ACNC: <0.2 UNITS (ref 0–0.9)

## 2025-02-10 ENCOUNTER — TELEPHONE (OUTPATIENT)
Age: 30
End: 2025-02-10

## 2025-02-10 NOTE — TELEPHONE ENCOUNTER
Relayed message to pt. Pt states understanding.      ----- Message from Dr. Panda Allison MD sent at 2/3/2025 11:15 AM EST -----  MRI shows no acute findings.  The bottom part of the brain hands a little low but nothing concerning. We can discuss further at f/u visit,  ----- Message -----  From: Nigel Erickson Incoming Orders Results To [facility]  Sent: 2/1/2025   9:29 AM EST  To: Panda Allison MD

## 2025-02-24 ENCOUNTER — OFFICE VISIT (OUTPATIENT)
Age: 30
End: 2025-02-24
Payer: COMMERCIAL

## 2025-02-24 VITALS
DIASTOLIC BLOOD PRESSURE: 68 MMHG | BODY MASS INDEX: 26.37 KG/M2 | HEIGHT: 67 IN | TEMPERATURE: 98 F | OXYGEN SATURATION: 99 % | HEART RATE: 67 BPM | SYSTOLIC BLOOD PRESSURE: 101 MMHG | WEIGHT: 168 LBS

## 2025-02-24 DIAGNOSIS — M62.838 MUSCLE SPASMS OF NECK: ICD-10-CM

## 2025-02-24 DIAGNOSIS — G43.709 CHRONIC MIGRAINE W/O AURA W/O STATUS MIGRAINOSUS, NOT INTRACTABLE: Primary | ICD-10-CM

## 2025-02-24 DIAGNOSIS — G93.5 CHIARI MALFORMATION TYPE I (HCC): ICD-10-CM

## 2025-02-24 DIAGNOSIS — G90.A POTS (POSTURAL ORTHOSTATIC TACHYCARDIA SYNDROME): ICD-10-CM

## 2025-02-24 DIAGNOSIS — M54.81 BILATERAL OCCIPITAL NEURALGIA: ICD-10-CM

## 2025-02-24 DIAGNOSIS — R55 VASOVAGAL SYNCOPE: ICD-10-CM

## 2025-02-24 PROCEDURE — 99214 OFFICE O/P EST MOD 30 MIN: CPT | Performed by: PSYCHIATRY & NEUROLOGY

## 2025-02-24 PROCEDURE — G2211 COMPLEX E/M VISIT ADD ON: HCPCS | Performed by: PSYCHIATRY & NEUROLOGY

## 2025-02-24 RX ORDER — UBROGEPANT 100 MG/1
100 TABLET ORAL DAILY PRN
Qty: 10 TABLET | Refills: 11 | Status: SHIPPED | OUTPATIENT
Start: 2025-02-24

## 2025-02-24 NOTE — PROGRESS NOTES
Tanika Pina is a 29 y.o. female (: 1995) presenting to address:    Chief Complaint   Patient presents with    Follow-up       Medication list and allergies have been reviewed with Tanika PHILLIPS Silvana and updated as of today's date.     I have gone over all Medical, Surgical and Social History with Tanika Pina and updated/added the information accordingly.       1. Have you been to the ER, Urgent Care or Hospitalized since your last visit? No      2. Have you followed up with your PCP or any other Physicians since your procedure/ last office visit?   No

## 2025-02-24 NOTE — PATIENT INSTRUCTIONS
Increased amitriptyline dose to 25 mg every day at bedtime.  Stop rizatriptan.  We will try to get ubrelvy approved for acute headache management.  MRI cervical/thoracic/lumbar spine  Neurosurgery referral to evaluate the low lying cerebellar tonsils/chiari 1 malformation  Maintain headache log   Regular physical activity is important for migraine prevention. At least 30 minutes of physical activity such as yoga, walking four times per week.  2.    Guided relaxation and meditation techniques are important for migraine prevention and for overall wellbeing. Please visit www.[employer] or youtube for videos to learn how to do these techniques. Start out with at least five minutes of mindfulness activity daily.  3.  Maintain a regular sleep schedule.  4. Home stretching exercises are great if you have tightness or tension in your neck and shoulders.  5. Follow up as scheduled here in the neurology clinic.

## 2025-02-24 NOTE — PROGRESS NOTES
SUBJECTIVE:   Tanika Pina is a 29 y.o. female seen for a follow up visit regarding the following:     Chief Complaint   Patient presents with    Follow-up       HPI:  She returns today for follow up. Still with frequent headaches. She had side effects concerning for allergic reaction with rizatriptan of difficulty breathing. No side effects with amitriptyline and it did help but headaches are still frequent.          Past Medical History, Past Surgical History, Family history, Social History, and Medications were all reviewed with the patient today and updated as necessary.     Current Outpatient Medications   Medication Sig Dispense Refill    amitriptyline (ELAVIL) 25 MG tablet Take 1 tablet by mouth nightly 90 tablet 0    albuterol sulfate HFA (VENTOLIN HFA) 108 (90 Base) MCG/ACT inhaler Inhale 2 puffs into the lungs 4 times daily as needed for Wheezing or Shortness of Breath 36 g 3    sertraline (ZOLOFT) 50 MG tablet Take 1 tablet by mouth daily 30 tablet 3    rizatriptan (MAXALT) 10 MG tablet Take 1 tablet by mouth daily as needed for Migraine May repeat in 2 hours if needed (Patient not taking: Reported on 2025) 12 tablet 5    Calcium Carb-Cholecalciferol (CALCIUM + VITAMIN D3) 500-5 MG-MCG TABS Take one tablet po bid (Patient not taking: Reported on 2025) 180 tablet 2    Ferrous Sulfate (IRON PO) Take by mouth Take daily during periods (Patient not taking: Reported on 2025)       No current facility-administered medications for this visit.     Allergies   Allergen Reactions    Sulfamethoxazole-Trimethoprim Hives and Swelling    Penicillins Hives     Patient Active Problem List   Diagnosis    Previous  section complicating pregnancy    Mild asthma without complication    Anxiety and depression    Bipolar depression (HCC)    History of iron deficiency    Chronic migraine without aura without status migrainosus, not intractable    History of syncope     Past Medical History: